# Patient Record
Sex: MALE | Race: WHITE
[De-identification: names, ages, dates, MRNs, and addresses within clinical notes are randomized per-mention and may not be internally consistent; named-entity substitution may affect disease eponyms.]

---

## 2019-03-02 ENCOUNTER — HOSPITAL ENCOUNTER (EMERGENCY)
Dept: HOSPITAL 4 - SED | Age: 64
Discharge: HOME | End: 2019-03-02
Payer: MEDICAID

## 2019-03-02 VITALS — HEIGHT: 62 IN | BODY MASS INDEX: 34.96 KG/M2 | WEIGHT: 190 LBS

## 2019-03-02 VITALS — SYSTOLIC BLOOD PRESSURE: 134 MMHG

## 2019-03-02 VITALS — SYSTOLIC BLOOD PRESSURE: 136 MMHG
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
## 2019-03-02 DIAGNOSIS — T15.92XA: Primary | ICD-10-CM
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
## 2019-03-02 PROCEDURE — 99283 EMERGENCY DEPT VISIT LOW MDM: CPT

## 2019-03-02 NOTE — NUR
Patient given written and verbal discharge instructions and verbalizes 
understanding.  ER MD discussed with patient the results and treatment 
provided. Patient in stable condition. ID arm band removed. 

Rx of Visine tears and Tylenol  given. Patient educated on pain management and 
to follow up with PMD. Pain Scale  0/10.

Opportunity for questions provided and answered. Medication side effect fact 
sheet provided.

## 2019-03-02 NOTE — NUR
Patient arrived to ED, AAOx4, and ambulatory with steady gait. Patient c/c of 
left eye pain today with foreign body sensation. Patient reports he was walking 
yesterday and feels as if a rock or dirt got into his eye. Pain is mild in 
severity. No known alleviating factors. No visual changes, drainage, fever, 
chills, headache, recent trauma, or other complaints. Will continue to follow 
up and monitor.